# Patient Record
Sex: MALE | Race: WHITE | Employment: OTHER | URBAN - METROPOLITAN AREA
[De-identification: names, ages, dates, MRNs, and addresses within clinical notes are randomized per-mention and may not be internally consistent; named-entity substitution may affect disease eponyms.]

---

## 2018-01-01 ENCOUNTER — PROCEDURE VISIT (OUTPATIENT)
Dept: CARDIOLOGY CLINIC | Age: 78
End: 2018-01-01

## 2018-01-01 DIAGNOSIS — Z95.810 ICD (IMPLANTABLE CARDIOVERTER-DEFIBRILLATOR) IN PLACE: Primary | ICD-10-CM

## 2018-05-07 PROBLEM — N18.30 CKD (CHRONIC KIDNEY DISEASE) STAGE 3, GFR 30-59 ML/MIN (HCC): Chronic | Status: ACTIVE | Noted: 2018-01-01

## 2018-05-07 PROBLEM — I50.32 CHRONIC DIASTOLIC HEART FAILURE (HCC): Status: RESOLVED | Noted: 2018-01-01 | Resolved: 2018-01-01

## 2018-05-07 PROBLEM — Z79.01 ANTICOAGULATED ON COUMADIN: Chronic | Status: ACTIVE | Noted: 2018-01-01

## 2018-05-07 PROBLEM — R06.03 RESPIRATORY DISTRESS: Status: ACTIVE | Noted: 2018-01-01

## 2018-05-07 PROBLEM — Z96.41 INSULIN PUMP IN PLACE: Status: ACTIVE | Noted: 2018-01-01

## 2018-05-07 PROBLEM — R06.03 RESPIRATORY DISTRESS: Status: RESOLVED | Noted: 2018-01-01 | Resolved: 2018-01-01

## 2018-05-07 PROBLEM — E78.2 MIXED HYPERLIPIDEMIA: Chronic | Status: ACTIVE | Noted: 2017-02-10

## 2018-05-07 PROBLEM — J98.4 CRLD (CHRONIC RESTRICTIVE LUNG DISEASE): Chronic | Status: ACTIVE | Noted: 2018-01-01

## 2018-05-07 PROBLEM — J98.4 CRLD (CHRONIC RESTRICTIVE LUNG DISEASE): Status: ACTIVE | Noted: 2018-01-01

## 2018-05-07 PROBLEM — I50.32 CHRONIC DIASTOLIC HEART FAILURE (HCC): Status: ACTIVE | Noted: 2018-01-01

## 2018-05-07 PROBLEM — Z96.41 INSULIN PUMP IN PLACE: Chronic | Status: ACTIVE | Noted: 2018-01-01

## 2018-05-07 PROBLEM — E78.2 MIXED HYPERLIPIDEMIA: Status: ACTIVE | Noted: 2017-02-10

## 2018-05-07 PROBLEM — S81.802A LEG WOUND, LEFT: Status: ACTIVE | Noted: 2018-01-01

## 2018-05-07 PROBLEM — R77.8 ELEVATED TROPONIN: Status: ACTIVE | Noted: 2018-01-01

## 2018-05-07 PROBLEM — Z95.810 PRESENCE OF COMBINATION INTERNAL CARDIAC DEFIBRILLATOR (ICD) AND PACEMAKER: Chronic | Status: ACTIVE | Noted: 2018-01-01

## 2018-05-07 PROBLEM — I25.10 CAD (CORONARY ARTERY DISEASE): Chronic | Status: ACTIVE | Noted: 2018-01-01

## 2018-05-07 PROBLEM — R65.10 SIRS (SYSTEMIC INFLAMMATORY RESPONSE SYNDROME) (HCC): Status: ACTIVE | Noted: 2018-01-01

## 2018-05-07 PROBLEM — Z87.891 FORMER SMOKER: Chronic | Status: ACTIVE | Noted: 2018-01-01

## 2018-05-12 PROBLEM — I27.20 PULMONARY HTN (HCC): Status: ACTIVE | Noted: 2018-01-01

## 2018-05-12 PROBLEM — Z96.41 INSULIN PUMP IN PLACE: Chronic | Status: RESOLVED | Noted: 2018-01-01 | Resolved: 2018-01-01

## 2018-05-16 PROBLEM — Z95.810 ICD (IMPLANTABLE CARDIOVERTER-DEFIBRILLATOR) IN PLACE: Status: ACTIVE | Noted: 2018-01-01

## 2018-05-17 PROBLEM — G93.41 METABOLIC ENCEPHALOPATHY: Status: ACTIVE | Noted: 2018-01-01

## 2018-06-06 PROBLEM — R77.8 ELEVATED TROPONIN: Status: RESOLVED | Noted: 2018-01-01 | Resolved: 2018-01-01

## 2019-01-01 ENCOUNTER — HOSPITAL ENCOUNTER (EMERGENCY)
Age: 79
End: 2019-05-05
Attending: EMERGENCY MEDICINE
Payer: MEDICARE

## 2019-01-01 DIAGNOSIS — I46.9 CARDIAC ARREST (HCC): Primary | ICD-10-CM

## 2019-01-01 LAB
HAV IGM SER IA-ACNC: NORMAL
HEPATITIS B CORE IGM ANTIBODY: NORMAL
HEPATITIS B SURFACE ANTIGEN INTERPRETATION: NORMAL
HEPATITIS C ANTIBODY INTERPRETATION: NORMAL
RAPID HIV 1&2: NORMAL

## 2019-01-01 PROCEDURE — 92950 HEART/LUNG RESUSCITATION CPR: CPT

## 2019-01-01 PROCEDURE — 94761 N-INVAS EAR/PLS OXIMETRY MLT: CPT

## 2019-01-01 PROCEDURE — 4500000025 HC ED LEVEL 5 PROCEDURE

## 2019-01-01 PROCEDURE — 99285 EMERGENCY DEPT VISIT HI MDM: CPT

## 2019-01-01 PROCEDURE — 80074 ACUTE HEPATITIS PANEL: CPT

## 2019-01-01 PROCEDURE — 86701 HIV-1ANTIBODY: CPT

## 2019-01-01 PROCEDURE — 94770 HC ETCO2 MONITOR DAILY: CPT

## 2019-01-01 PROCEDURE — 6360000002 HC RX W HCPCS: Performed by: EMERGENCY MEDICINE

## 2019-01-01 PROCEDURE — 2700000000 HC OXYGEN THERAPY PER DAY

## 2019-01-01 RX ADMIN — EPINEPHRINE 1 MG: 0.1 INJECTION, SOLUTION ENDOTRACHEAL; INTRACARDIAC; INTRAVENOUS at 01:37

## 2019-01-01 RX ADMIN — EPINEPHRINE 1 MG: 0.1 INJECTION, SOLUTION ENDOTRACHEAL; INTRACARDIAC; INTRAVENOUS at 01:28

## 2019-01-01 RX ADMIN — EPINEPHRINE 1 MG: 0.1 INJECTION, SOLUTION ENDOTRACHEAL; INTRACARDIAC; INTRAVENOUS at 01:32

## 2019-05-05 NOTE — ED NOTES
[facility] family would like to use [place] in [facility], however, they request patient be buried in [facility]. Request passed to Kendall Gaucher.       Pratibha Moser RN  05/05/19 7219

## 2019-05-05 NOTE — FLOWSHEET NOTE
paged for family support following Mr. Negin Butts unexpected death. His wife, daughter and son in law are at his bedside. MrElmer Fowler was an [employer] , and it was his wish to be buried in [facility]. Family is uncertain of the process for making these arrangements.  attempted to call [facility] phone number regarding [place], but this number was unavailable due to technical issues. 's Affairs number provided to the family. [phone] [place] selected by the family. Family requesting patient's clothing. Clothing to be sent with patient to  home, per [place]. Condolences offered. Hospital number and  contact provided. Prayer declined. Bereavement packet will be mailed to the family by spiritual care.    Chaplain Cedric Castillo [address]

## 2019-05-05 NOTE — ED NOTES
Patient was found down at [facility]. Last seen was 1 hour prior to EMS arrival. Total downtime upon arrival to Hospital 1 hour and 30 min. Patients defibrillator had shocked patient countless times en route. Patient remained PEA / V-fib for EMS. Sugar for EMS was 235. Roseann Gutter was placed on patient per EMS and used by RN's at hospital as well for compressions. Prior to arrival by EMS patient received 2 rounds of epi in squad.      Lashon Harmon RN  05/05/19 [id number]

## 2019-05-05 NOTE — PROGRESS NOTES
Call to PT family doctor @ [phone]  Re: death certificate and notification of PT death  Dr. Hilario Holley @ [phone]

## 2019-05-05 NOTE — ED PROVIDER NOTES
I performed the below procedure. Attending MD HEATHER Henley was in the room for the entire procedure. PROCEDURE NOTE:     ENDOTRACHEAL INTUBATION    Indication: Need for advanced airway     Consent: Unable to be obtained due to the emergent nature of this procedure. Rita Madrigal was pre-oxygenated prior to intubation. Suction was ready. Emergent airway needed, sedation not given as active CPR was in progress. The vocal cords were visualized with use of glide scope, and the 7.5 endotracheal tube was inserted at 24 cm at the lip line without complication. Tracheal position was confirmed using auscultation, capnometry. The tube was appropriately secured.          CATRINA Almeida - CHRISTINA  05/05/19 4930

## 2019-05-05 NOTE — ED PROVIDER NOTES
solution Inhale 1 vial into the lungs every 6 hours    Historical Provider, MD   calcium carbonate-vitamin D (CALCIUM + D) 600-200 MG-UNIT TABS Take 2 tablets by mouth 2 times daily     Historical Provider, MD   insulin aspart (NOVOLOG) 100 UNIT/ML injection Inject into the skin continuous Insulin PUMP    Historical Provider, MD   simvastatin (ZOCOR) 40 MG tablet Take 40 mg by mouth nightly. Historical Provider, MD   ranitidine (ZANTAC) 150 MG tablet Take 150 mg by mouth 2 times daily. Historical Provider, MD   potassium chloride SA (K-DUR;KLOR-CON) 20 MEQ tablet Take 20 mEq by mouth 2 times daily     Historical Provider, MD   therapeutic multivitamin-minerals (THERAGRAN-M) tablet Take 1 tablet by mouth daily. Historical Provider, MD   allopurinol (ZYLOPRIM) 100 MG tablet Take 100 mg by mouth 2 times daily. Historical Provider, MD       Allergies as of 05/05/2019 - Review Complete 05/17/2018   Allergen Reaction Noted    Prinivil [lisinopril]  03/20/2012    Sulfa antibiotics  03/20/2012       Past Medical History:   Diagnosis Date    Atrial fibrillation ([facility]     CKD (chronic kidney disease)     Diabetes mellitus ([facility]     Gout     Hyperlipidemia     Hypertension     Myocardial infarction ([facility]     Skin cancer     Thyroid disease         Surgical History:   Past Surgical History:   Procedure Laterality Date    CORONARY ANGIOPLASTY WITH STENT PLACEMENT      EYE SURGERY      PACEMAKER INSERTION          Family History:  No family history on file.     Social History     Socioeconomic History    Marital status:      Spouse name: Not on file    Number of children: Not on file    Years of education: Not on file    Highest education level: Not on file   Occupational History    Not on file   Social Needs    Financial resource strain: Not on file    Food insecurity:     Worry: Not on file     Inability: Not on file    Transportation needs:     Medical: Not on file     Non-medical: Not on file   Tobacco Use    Smoking status: Former Smoker     Last attempt to quit: 1970     Years since quittin.9   Substance and Sexual Activity    Alcohol use: No    Drug use: No    Sexual activity: Not on file   Lifestyle    Physical activity:     Days per week: Not on file     Minutes per session: Not on file    Stress: Not on file   Relationships    Social connections:     Talks on phone: Not on file     Gets together: Not on file     Attends Jewish service: Not on file     Active member of club or organization: Not on file     Attends meetings of clubs or organizations: Not on file     Relationship status: Not on file    Intimate partner violence:     Fear of current or ex partner: Not on file     Emotionally abused: Not on file     Physically abused: Not on file     Forced sexual activity: Not on file   Other Topics Concern    Not on file   Social History Narrative    Not on file       Nursing notes reviewed. ED Triage Vitals   Enc Vitals Group      BP       Pulse       Resp       Temp       Temp src       SpO2       Weight       Height       Head Circumference       Peak Flow       Pain Score       Pain Loc       Pain Edu? Excl. in [address]? GENERAL:  Unresponsive, active CPR in progress  HENT:  Normocephalic, Atraumatic, no lacerations. Eye gel in place  EYES: fixed and dilated bilaterally  NECK:  Trachea is midline. No stridor. CHEST:  Diffuse CPR in progress  LUNGS:  Apneic breathing  ABDOMEN:  Soft, non-tender, distended.    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  SKIN: Cool and clammy   NEUROLOGIC: Unresponsive      LABS and DIAGNOSTIC RESULTS      LABS  Results for orders placed or performed during the hospital encounter of 19   HIV Rapid 1&2   Result Value Ref Range    Rapid HIV 1&2 Non-reactive Non-reactive       PROCEDURES  CODE SUMMARY  (see code summary for details) - Initial rhythm  ventricular fibrillation     Medications given:  epinephrine:  Yes  atropine:  No  sodium bicarbonate:  Yes  calcium:  No  D50:  No  magnesium:  No  Amiodarone:  No   Procainamide:  No  IVF 1000mL:  Yes     Procedures:  Interventions:  defibrillation:  Yes   cardioversion:  No   external pacer:  No    Final rhythm:  asystole     Patient pronounced dead at 1:20 AM on 19        [facility]    Procedures/interventions/images ordered for this visit  Orders Placed This Encounter   Procedures    Hepatitis Panel, Acute    HIV Rapid 1&2    Preliminary cause of death       Medications ordered for this visit  Orders Placed This Encounter   Medications    EPINEPHrine PF 1 MG/10ML injection       Shortly after the code summary was completed, I went and spoke with the family. Patient has his wife and his sister-in-law present. Final Impression    1. Cardiac arrest ([facility] [address]        There were no vitals taken for this visit. Patient and/or companions verbalized understanding of the ED workup, any relevant findings as well as any incidental findings, and the disposition and plan. Questions sought and answered with the patient and/or family members. They voice understanding and agree with plan. If the patient was discharged from the ED, they were instructed to return for any worsening or worrisome concerns. Patient was given scripts for the following medications. I counseled patient how to take these medications. New Prescriptions    No medications on file       Disposition  . Please note, critical care time was 15 minutes, obtaining history, conducting a physical exam, performing and monitoring interventions, ordering, collecting and interpreting tests, and establishing medical decision-making and discussion with the patient and/or family, specifically for management of the presenting complaint and symptoms initially, direct bedside care, reevaluation, review of records, and consultation.   There was a high probability of clinically significant life-threatening deterioration in the patient's condition, which required my urgent intervention. This time does not include separately billable procedures. The note was completed using Dragon voice recognition transcription. Every effort was made to ensure accuracy; however, inadvertent transcription errors may be present despite my best efforts to edit errors.     Aaron Nicholson MD  [address] [place] [address]        Aaron Nicholson MD  05/05/19 7119